# Patient Record
Sex: MALE | Race: WHITE | NOT HISPANIC OR LATINO | URBAN - METROPOLITAN AREA
[De-identification: names, ages, dates, MRNs, and addresses within clinical notes are randomized per-mention and may not be internally consistent; named-entity substitution may affect disease eponyms.]

---

## 2017-02-24 ENCOUNTER — OFFICE (OUTPATIENT)
Dept: URBAN - METROPOLITAN AREA CLINIC 19 | Facility: CLINIC | Age: 77
End: 2017-02-24
Payer: MEDICARE

## 2017-02-24 VITALS
DIASTOLIC BLOOD PRESSURE: 73 MMHG | SYSTOLIC BLOOD PRESSURE: 138 MMHG | HEIGHT: 69 IN | WEIGHT: 160 LBS | HEART RATE: 73 BPM

## 2017-02-24 DIAGNOSIS — R30.0 DYSURIA: ICD-10-CM

## 2017-02-24 DIAGNOSIS — N13.9 OBSTRUCTIVE AND REFLUX UROPATHY, UNSPECIFIED: ICD-10-CM

## 2017-02-24 DIAGNOSIS — R19.7 DIARRHEA, UNSPECIFIED: ICD-10-CM

## 2017-02-24 PROCEDURE — G8427 DOCREV CUR MEDS BY ELIG CLIN: HCPCS | Performed by: INTERNAL MEDICINE

## 2017-02-24 PROCEDURE — 99213 OFFICE O/P EST LOW 20 MIN: CPT | Performed by: INTERNAL MEDICINE

## 2017-02-24 RX ORDER — LEVOFLOXACIN 750 MG/1
TABLET, FILM COATED ORAL
Qty: 7 | Refills: 0 | Status: COMPLETED
Start: 2017-02-24 | End: 2017-03-28

## 2017-02-24 RX ORDER — METRONIDAZOLE 500 MG/1
1500 TABLET, FILM COATED ORAL
Qty: 42 | Refills: 0 | Status: COMPLETED
Start: 2017-02-24 | End: 2017-03-28

## 2017-02-24 NOTE — SERVICENOTES
(I have treated the patient with a renewal of his Levaquin for potential impending UTI recurrence because of the lateness of this office visit on a Friday afternoon and potential difficulties patient may encounter accessing outpatient urologic care over the weekend.)

## 2017-02-24 NOTE — SERVICEHPINOTES
(Patient is seen today as a work in appointment at the request of his wife who had a scheduled appointment today.)Patient has had diarrhea which has been worse the past 3 days.He was previously treated for diarrhea during her recent hospitalization at which time a CT scan of the abdomen and pelvis suggested colitis with the finding of diffuse wall thickening distal to his splenic flexure. Apparently at the instruction of infectious disease, Dr. DAVIN Lyles, the patient was discharged with metronidazole and Levaquin.Patient was hospitalized at [facility] between February 5 in February 7 when he presented with a change in mental status and was found to have urinary tract infection/urosepsis. His white count was 15,000 at presentation to the emergency room.Patient apparently has a lengthy history of urologic problems which may be his primary symptom today. He complains of difficulty urination and a sensation of persistent lower abdominal fullness/discomfort despite his routine in-and-out self t catheterization twice per day. His urologist is Dr. Bowser in [facility]. Though the patient did follow up with his primary care physician after his hospitalization, Dr. Michael Roger, the patient has not yet notified Dr. Bowser of his episode of urosepsis. (The patient states a repeat urinalysis was clear that Dr. Lo's office 10 days ago.)

## 2017-02-25 LAB
BASIC METABOLIC PANEL: CALCIUM TOTAL: 9.1 MG/DL (ref 8.5–10.5)
BASIC METABOLIC PANEL: CARBON DIOXIDE: 24 MEQ/L (ref 21–31)
BASIC METABOLIC PANEL: CHLORIDE: 103 MEQ/L (ref 96–106)
BASIC METABOLIC PANEL: CREATININE: 0.96 MG/DL (ref 0.8–1.4)
BASIC METABOLIC PANEL: GLUCOSE: 95 MG/DL (ref 65–100)
BASIC METABOLIC PANEL: POTASSIUM: 4 MEQ/ML (ref 3.5–5.4)
BASIC METABOLIC PANEL: SODIUM: 140 MEQ/L (ref 135–145)
BASIC METABOLIC PANEL: UREA NITROGEN: 14 MG/DL (ref 8–23)
CBCI COMPLETE BLOOD COUNT W/ DIFF: ABS BASOPHILS: 0 K/UL (ref 0–0.3)
CBCI COMPLETE BLOOD COUNT W/ DIFF: ABS EOSINOPHILS: 0 K/UL — LOW (ref 0.05–0.5)
CBCI COMPLETE BLOOD COUNT W/ DIFF: ABS LYMPHOCYTES: 0.7 K/UL — LOW (ref 1–4)
CBCI COMPLETE BLOOD COUNT W/ DIFF: ABS MONOCYTES: 0.9 K/UL (ref 0.1–1.1)
CBCI COMPLETE BLOOD COUNT W/ DIFF: ABS NEUTROPHILS: 5.5 K/UL (ref 1.8–7)
CBCI COMPLETE BLOOD COUNT W/ DIFF: BASOPHILS: 0.1 % (ref 0–3)
CBCI COMPLETE BLOOD COUNT W/ DIFF: EOSINOPHILS: 0.3 % — LOW (ref 1–7)
CBCI COMPLETE BLOOD COUNT W/ DIFF: HEMATOCRIT: 41.7 % (ref 39–55)
CBCI COMPLETE BLOOD COUNT W/ DIFF: HEMOGLOBIN: 13.9 G/DL (ref 13–17.5)
CBCI COMPLETE BLOOD COUNT W/ DIFF: LYMPHOCYTES: 10.3 % — LOW (ref 20–48)
CBCI COMPLETE BLOOD COUNT W/ DIFF: MCH: 31.5 PG (ref 25–35)
CBCI COMPLETE BLOOD COUNT W/ DIFF: MCHC: 33.3 % (ref 30–38)
CBCI COMPLETE BLOOD COUNT W/ DIFF: MCV: 94.6 FL (ref 78–102)
CBCI COMPLETE BLOOD COUNT W/ DIFF: MONOCYTES: 12.4 % (ref 3–14)
CBCI COMPLETE BLOOD COUNT W/ DIFF: NEUTROPHILS: 76.9 % — HIGH (ref 40–70)
CBCI COMPLETE BLOOD COUNT W/ DIFF: PLATELET COUNT: 230 K/UL (ref 150–450)
CBCI COMPLETE BLOOD COUNT W/ DIFF: RBC DISTRIBUTION WIDTH: 14 % (ref 11.5–16)
CBCI COMPLETE BLOOD COUNT W/ DIFF: RED BLOOD CELL COUNT: 4.41 M/UL (ref 4.3–5.7)
CBCI COMPLETE BLOOD COUNT W/ DIFF: WHITE BLOOD CELL COUNT: 7.1 K/UL (ref 4–11)

## 2017-02-28 LAB — CLOSTRIDIUM DIFFICILE TOXIN: CD TOXIN: (no result)

## 2017-03-28 ENCOUNTER — OFFICE (OUTPATIENT)
Dept: URBAN - METROPOLITAN AREA CLINIC 19 | Facility: CLINIC | Age: 77
End: 2017-03-28
Payer: MEDICARE

## 2017-03-28 VITALS
HEART RATE: 57 BPM | SYSTOLIC BLOOD PRESSURE: 143 MMHG | DIASTOLIC BLOOD PRESSURE: 71 MMHG | HEIGHT: 69 IN | WEIGHT: 163.8 LBS

## 2017-03-28 DIAGNOSIS — A04.7 ENTEROCOLITIS DUE TO CLOSTRIDIUM DIFFICILE: ICD-10-CM

## 2017-03-28 DIAGNOSIS — R19.7 DIARRHEA, UNSPECIFIED: ICD-10-CM

## 2017-03-28 PROCEDURE — G8427 DOCREV CUR MEDS BY ELIG CLIN: HCPCS | Performed by: INTERNAL MEDICINE

## 2017-03-28 PROCEDURE — 99213 OFFICE O/P EST LOW 20 MIN: CPT | Performed by: INTERNAL MEDICINE

## 2018-05-07 ENCOUNTER — OFFICE (OUTPATIENT)
Dept: URBAN - METROPOLITAN AREA CLINIC 19 | Facility: CLINIC | Age: 78
End: 2018-05-07

## 2018-05-07 VITALS
HEIGHT: 69 IN | WEIGHT: 156 LBS | HEART RATE: 50 BPM | SYSTOLIC BLOOD PRESSURE: 157 MMHG | DIASTOLIC BLOOD PRESSURE: 4 MMHG

## 2018-05-07 DIAGNOSIS — K59.00 CONSTIPATION, UNSPECIFIED: ICD-10-CM

## 2018-05-07 DIAGNOSIS — A04.71 ENTEROCOLITIS DUE TO CLOSTRIDIUM DIFFICILE, RECURRENT: ICD-10-CM

## 2018-05-07 PROCEDURE — 99213 OFFICE O/P EST LOW 20 MIN: CPT | Performed by: INTERNAL MEDICINE

## 2018-05-07 RX ORDER — METRONIDAZOLE 500 MG/1
1500 TABLET, FILM COATED ORAL
Qty: 42 | Refills: 0 | Status: ACTIVE
Start: 2018-05-07

## 2018-05-07 RX ORDER — METRONIDAZOLE 250 MG/1
TABLET, FILM COATED ORAL
Qty: 60 | Refills: 10 | Status: ACTIVE
Start: 2018-05-07

## 2018-05-07 NOTE — SERVICENOTES
-I recommend the patient take a more vigorous chronic constipation management than MiraLax 1 cap full per day.  He did develop impaction on the MiraLax 1 per day.  I have suggested he add generic Senokot 2 per day with existing MiraLax.  If the patient has wife decided to transition to MiraLax twice per day, they know to overlap the generic Senokot with the 2nd dose of MiraLax for 7-10 days before discontinuing the Senokot.

-I have cautioned the patient's wife the C difficile colitis may recur and hypersensitive patient is within 3 months.  I provided them with a stool collection kit as well as a prescription for metronidazole to take if he has recurrent similar symptoms characteristic of his C difficile colitis.  (The patient's wife states she recognize the C difficile and will treat accordingly.)

## 2018-05-07 NOTE — SERVICEHPINOTES
Mr. Shell is a pleasant male who is following up after diarrhea, impaction, and a UTI at [facility] on April 4, 2018. At the hospital, he received IV antibiotics for his UTI. He was discharged on Bactrim. Dr. Sue performed a colonscopy which did not show anything. After discharge, he had another episode of  c. diff. He took Flagyl for this.  He has had a previous c. diff infection in February 2017. He reports he is still having issues with chronic constipation. For his constipation, he is taking Miralax once a day. He was taking this at the time of his diarrhea/impaction.  After several days, he will then have loose bowel movements. He was discharged with a prescription of Linzess, but he has not started this yet.   BR